# Patient Record
(demographics unavailable — no encounter records)

---

## 2025-05-08 NOTE — HISTORY OF PRESENT ILLNESS
[de-identified] : 18F with no prior medical hx presenting as a consultation for a lump in her belly button that she felt 72 hours ago, she was lifting boxes at work when she developed pain and swelling at the umbilical region. she denies nausea and vomiting; she has never experienced an event like this before. Her pain is still present but improving.

## 2025-05-08 NOTE — ASSESSMENT
[FreeTextEntry1] : 18F with incarcerated umbilical hernia  Ms Green was told significance of findings, options, risks and benefits were explained. Informed consent for Open umbilical hernia repair, and potential risks, benefits and alternatives (surgical options were discussed including non-surgical options or the option of no surgery) to the planned surgery were discussed in depth. All surgical options were discussed including non-surgical treatments. Patient wishes to proceed with surgery. We will plan for surgery on at the next available date, pending any required insurance pre-certification or pre-approval. She agrees to obtain any necessary pre-operative evaluations and testing prior to surgery. Patient advised to seek immediate medical attention with any acute change in symptoms or with the development of any new or worsening symptoms. Patient's questions and concerns addressed to patient's satisfaction, and patient verbalized an understanding of the information discussed.

## 2025-05-08 NOTE — PRE-SURGICAL ASSESSMENT
[PST Chart Review] : PST Chart Review (low patient risk, very low procedural risk) [FreeTextEntry1] : Open umbilical hernia repair [3 weeks] : 3 weeks [none] : none [S: Do you SNORE loudly] : does not snore loudly [T: Are you TIRED, fatigued, or sleepy during the daytime] : not tired, fatigued, or sleepy during the daytime [O: Has anyone OBSERVED you stop breathing during your sleep] : not observed to have stopped during sleep [P: Do you have, or are you being treated for, high blood PRESSURE] : no hypertension, not treated for high blood pressure [B: BMI greater than 35 kG/m2] : BMI less than 35 kG/m2 [A: AGE over 50 years old] : not over 50 years of age [N: NECK circumference greater than 16 inches] : neck circumference less than 16 inches [G: GENDER (birth sex) = Male] : not male (birth sex) [0 - 2: Low Risk] : 0 - 2: Low Risk [Willing to accept blood - if not, specify reason >>] : Willing to accept blood [Devices being used in treatment (i.e., pacemaker, defib, loop recorder, stimulator, pain pump, other devices/implantables)] : No devices in use in treatment [No] : No, patient is not pregnant

## 2025-05-08 NOTE — PHYSICAL EXAM
[Normal Breath Sounds] : Normal breath sounds [Normal Heart Sounds] : normal heart sounds [No Rash or Lesion] : No rash or lesion [Purpura] : no purpura  [Petechiae] : no petechiae [Skin Ulcer] : no ulcer [Skin Induration] : no induration [Alert] : alert [Oriented to Person] : oriented to person [Oriented to Place] : oriented to place [Oriented to Time] : oriented to time [Calm] : calm [de-identified] : In no acute distress [de-identified] : NCAT [de-identified] : supple [de-identified] : soft, incarcerated umbilical hernia with no skin changes, mild to moderate tenderness. abdomen otherwise soft, non distended  [de-identified] : 5/5 motor and sensory b/l